# Patient Record
Sex: FEMALE | Race: WHITE | NOT HISPANIC OR LATINO | URBAN - METROPOLITAN AREA
[De-identification: names, ages, dates, MRNs, and addresses within clinical notes are randomized per-mention and may not be internally consistent; named-entity substitution may affect disease eponyms.]

---

## 2019-03-15 ENCOUNTER — APPOINTMENT (RX ONLY)
Dept: URBAN - METROPOLITAN AREA CLINIC 303 | Facility: CLINIC | Age: 74
Setting detail: DERMATOLOGY
End: 2019-03-15

## 2019-03-15 DIAGNOSIS — D22 MELANOCYTIC NEVI: ICD-10-CM

## 2019-03-15 DIAGNOSIS — Z85.828 PERSONAL HISTORY OF OTHER MALIGNANT NEOPLASM OF SKIN: ICD-10-CM

## 2019-03-15 DIAGNOSIS — L82.1 OTHER SEBORRHEIC KERATOSIS: ICD-10-CM

## 2019-03-15 DIAGNOSIS — L71.8 OTHER ROSACEA: ICD-10-CM | Status: WELL CONTROLLED

## 2019-03-15 DIAGNOSIS — L81.4 OTHER MELANIN HYPERPIGMENTATION: ICD-10-CM

## 2019-03-15 DIAGNOSIS — D485 NEOPLASM OF UNCERTAIN BEHAVIOR OF SKIN: ICD-10-CM

## 2019-03-15 PROBLEM — D48.5 NEOPLASM OF UNCERTAIN BEHAVIOR OF SKIN: Status: ACTIVE | Noted: 2019-03-15

## 2019-03-15 PROBLEM — L57.0 ACTINIC KERATOSIS: Status: ACTIVE | Noted: 2019-03-15

## 2019-03-15 PROBLEM — D22.5 MELANOCYTIC NEVI OF TRUNK: Status: ACTIVE | Noted: 2019-03-15

## 2019-03-15 PROBLEM — M06.9 RHEUMATOID ARTHRITIS, UNSPECIFIED: Status: ACTIVE | Noted: 2019-03-15

## 2019-03-15 PROBLEM — Z85.3 PERSONAL HISTORY OF MALIGNANT NEOPLASM OF BREAST: Status: ACTIVE | Noted: 2019-03-15

## 2019-03-15 PROCEDURE — ? COUNSELING

## 2019-03-15 PROCEDURE — 11102 TANGNTL BX SKIN SINGLE LES: CPT

## 2019-03-15 PROCEDURE — ? INVENTORY

## 2019-03-15 PROCEDURE — ? FOLLOW UP FOR NEXT VISIT

## 2019-03-15 PROCEDURE — ? PRESCRIPTION MEDICATION MANAGEMENT

## 2019-03-15 PROCEDURE — 99214 OFFICE O/P EST MOD 30 MIN: CPT | Mod: 25

## 2019-03-15 PROCEDURE — ? SUNSCREEN RECOMMENDATIONS

## 2019-03-15 PROCEDURE — ? LIQUID NITROGEN

## 2019-03-15 PROCEDURE — ? BIOPSY BY SHAVE METHOD

## 2019-03-15 PROCEDURE — ? ADDITIONAL NOTES

## 2019-03-15 ASSESSMENT — LOCATION ZONE DERM
LOCATION ZONE: TRUNK
LOCATION ZONE: SCALP
LOCATION ZONE: FACE

## 2019-03-15 ASSESSMENT — LOCATION SIMPLE DESCRIPTION DERM
LOCATION SIMPLE: RIGHT CHEEK
LOCATION SIMPLE: ABDOMEN
LOCATION SIMPLE: RIGHT LOWER BACK
LOCATION SIMPLE: LEFT LOWER BACK
LOCATION SIMPLE: RIGHT SCALP

## 2019-03-15 ASSESSMENT — LOCATION DETAILED DESCRIPTION DERM
LOCATION DETAILED: RIGHT INFERIOR LATERAL MIDBACK
LOCATION DETAILED: LEFT INFERIOR MEDIAL MIDBACK
LOCATION DETAILED: RIGHT MEDIAL FRONTAL SCALP
LOCATION DETAILED: RIGHT LATERAL BUCCAL CHEEK
LOCATION DETAILED: EPIGASTRIC SKIN

## 2019-03-15 NOTE — PROCEDURE: LIQUID NITROGEN
Bill Insurance (You Assume Risk Of Denial Or Audit By Selecting Yes): No
Medical Necessity Clause: This procedure was medically necessary because the lesions that were treated were:
Detail Level: Detailed
Duration Of Freeze Thaw-Cycle (Seconds): 0
Consent: The patient's consent was obtained including but not limited to risks of crusting, scabbing, blistering, scarring, darker or lighter pigmentary change, recurrence, incomplete removal and infection.\\nDone at no charge.
Post-Care Instructions: I reviewed with the patient in detail post-care instructions. Patient is to wear sunprotection, and avoid picking at any of the treated lesions. Pt may apply Vaseline to crusted or scabbing areas.\\nDone at no charge.
Medical Necessity Information: It is in your best interest to select a reason for this procedure from the list below. All of these items fulfill various CMS LCD requirements except the new and changing color options.
Number Of Freeze-Thaw Cycles: 2 freeze-thaw cycles

## 2019-03-15 NOTE — HPI: RASH (ROSACEA)
How Severe Is Your Rosacea?: mild
Is This A New Presentation, Or A Follow-Up?: Follow Up Rosacea
Additional History: Doing well, has used metrogel 0.75 in past, not using now.

## 2019-03-15 NOTE — HPI: EVALUATION OF SKIN LESION(S)
Hpi Title: Evaluation of Skin Lesions
How Severe Are Your Spot(S)?: mild
Have Your Spot(S) Been Treated In The Past?: has not been treated
Additional History: History SCCIS right frontal scalp 2013, with recurrence 2014 and 2015. Has crusty place by scar, uses 5FU which will help it to resolve, but comes back. Needs refill for 5FU.

## 2019-03-15 NOTE — PROCEDURE: ADDITIONAL NOTES
Detail Level: Simple
Additional Notes: Does not want 5FU refill today, wants to wait and see what biopsy shows

## 2019-03-15 NOTE — PROCEDURE: PRESCRIPTION MEDICATION MANAGEMENT
Detail Level: Zone
Render In Strict Bullet Format?: No
Continue Regimen: Metrogel 0.75 bid prn, can call if needs refill

## 2021-11-17 NOTE — PROCEDURE: BIOPSY BY SHAVE METHOD
165.1 Electrodesiccation Text: The wound bed was treated with electrodesiccation after the biopsy was performed.